# Patient Record
Sex: FEMALE | Race: WHITE | NOT HISPANIC OR LATINO
[De-identification: names, ages, dates, MRNs, and addresses within clinical notes are randomized per-mention and may not be internally consistent; named-entity substitution may affect disease eponyms.]

---

## 2017-04-19 ENCOUNTER — RESULT REVIEW (OUTPATIENT)
Age: 67
End: 2017-04-19

## 2017-04-20 ENCOUNTER — APPOINTMENT (OUTPATIENT)
Dept: SURGERY | Facility: CLINIC | Age: 67
End: 2017-04-20

## 2017-04-20 ENCOUNTER — LABORATORY RESULT (OUTPATIENT)
Age: 67
End: 2017-04-20

## 2017-04-20 VITALS
HEART RATE: 86 BPM | DIASTOLIC BLOOD PRESSURE: 83 MMHG | HEIGHT: 62 IN | SYSTOLIC BLOOD PRESSURE: 156 MMHG | BODY MASS INDEX: 31.28 KG/M2 | WEIGHT: 170 LBS

## 2017-04-20 DIAGNOSIS — Z80.0 FAMILY HISTORY OF MALIGNANT NEOPLASM OF DIGESTIVE ORGANS: ICD-10-CM

## 2017-04-27 ENCOUNTER — OTHER (OUTPATIENT)
Age: 67
End: 2017-04-27

## 2017-05-23 ENCOUNTER — APPOINTMENT (OUTPATIENT)
Dept: SURGERY | Facility: CLINIC | Age: 67
End: 2017-05-23

## 2017-05-23 RX ORDER — ALPRAZOLAM 0.5 MG/1
0.5 TABLET ORAL
Qty: 30 | Refills: 0 | Status: ACTIVE | COMMUNITY
Start: 2017-03-06

## 2018-07-23 PROBLEM — Z80.0 FAMILY HISTORY OF COLON CANCER: Status: ACTIVE | Noted: 2017-04-20

## 2018-12-27 ENCOUNTER — APPOINTMENT (OUTPATIENT)
Dept: SURGERY | Facility: CLINIC | Age: 68
End: 2018-12-27
Payer: MEDICARE

## 2018-12-27 DIAGNOSIS — R22.1 LOCALIZED SWELLING, MASS AND LUMP, NECK: ICD-10-CM

## 2018-12-27 PROCEDURE — 99214 OFFICE O/P EST MOD 30 MIN: CPT

## 2019-01-07 ENCOUNTER — OTHER (OUTPATIENT)
Age: 69
End: 2019-01-07

## 2019-05-23 ENCOUNTER — APPOINTMENT (OUTPATIENT)
Dept: SURGERY | Facility: CLINIC | Age: 69
End: 2019-05-23
Payer: MEDICARE

## 2019-05-23 PROCEDURE — 99213 OFFICE O/P EST LOW 20 MIN: CPT

## 2019-05-23 NOTE — HISTORY OF PRESENT ILLNESS
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 denies pain, drainage, infection, new lesions. no changes in headaches.  no changes in meningioma.

## 2019-05-23 NOTE — PHYSICAL EXAM
[de-identified] : no palpable thyroid nodules [Laryngoscopy Performed] : laryngoscopy was performed, see procedure section for findings [Midline] : located in midline position [Normal] : orientation to person, place, and time: normal [de-identified] : 1.5 cm right parotid mass, well circumscribed and mobile

## 2019-07-09 ENCOUNTER — OUTPATIENT (OUTPATIENT)
Dept: OUTPATIENT SERVICES | Facility: HOSPITAL | Age: 69
LOS: 1 days | End: 2019-07-09
Payer: MEDICARE

## 2019-07-09 VITALS
RESPIRATION RATE: 16 BRPM | WEIGHT: 173.94 LBS | SYSTOLIC BLOOD PRESSURE: 120 MMHG | HEIGHT: 60.75 IN | OXYGEN SATURATION: 97 % | TEMPERATURE: 99 F | HEART RATE: 71 BPM | DIASTOLIC BLOOD PRESSURE: 74 MMHG

## 2019-07-09 DIAGNOSIS — Z98.89 OTHER SPECIFIED POSTPROCEDURAL STATES: Chronic | ICD-10-CM

## 2019-07-09 DIAGNOSIS — K11.9 DISEASE OF SALIVARY GLAND, UNSPECIFIED: ICD-10-CM

## 2019-07-09 DIAGNOSIS — Z98.890 OTHER SPECIFIED POSTPROCEDURAL STATES: Chronic | ICD-10-CM

## 2019-07-09 LAB
ANION GAP SERPL CALC-SCNC: 13 MMO/L — SIGNIFICANT CHANGE UP (ref 7–14)
BUN SERPL-MCNC: 17 MG/DL — SIGNIFICANT CHANGE UP (ref 7–23)
CALCIUM SERPL-MCNC: 9.4 MG/DL — SIGNIFICANT CHANGE UP (ref 8.4–10.5)
CHLORIDE SERPL-SCNC: 99 MMOL/L — SIGNIFICANT CHANGE UP (ref 98–107)
CO2 SERPL-SCNC: 27 MMOL/L — SIGNIFICANT CHANGE UP (ref 22–31)
CREAT SERPL-MCNC: 0.63 MG/DL — SIGNIFICANT CHANGE UP (ref 0.5–1.3)
GLUCOSE SERPL-MCNC: 64 MG/DL — LOW (ref 70–99)
HCT VFR BLD CALC: 45.5 % — HIGH (ref 34.5–45)
HGB BLD-MCNC: 14.3 G/DL — SIGNIFICANT CHANGE UP (ref 11.5–15.5)
MCHC RBC-ENTMCNC: 28.6 PG — SIGNIFICANT CHANGE UP (ref 27–34)
MCHC RBC-ENTMCNC: 31.4 % — LOW (ref 32–36)
MCV RBC AUTO: 91 FL — SIGNIFICANT CHANGE UP (ref 80–100)
NRBC # FLD: 0 K/UL — SIGNIFICANT CHANGE UP (ref 0–0)
PLATELET # BLD AUTO: 308 K/UL — SIGNIFICANT CHANGE UP (ref 150–400)
PMV BLD: 9.5 FL — SIGNIFICANT CHANGE UP (ref 7–13)
POTASSIUM SERPL-MCNC: 3.4 MMOL/L — LOW (ref 3.5–5.3)
POTASSIUM SERPL-SCNC: 3.4 MMOL/L — LOW (ref 3.5–5.3)
RBC # BLD: 5 M/UL — SIGNIFICANT CHANGE UP (ref 3.8–5.2)
RBC # FLD: 14 % — SIGNIFICANT CHANGE UP (ref 10.3–14.5)
SODIUM SERPL-SCNC: 139 MMOL/L — SIGNIFICANT CHANGE UP (ref 135–145)
WBC # BLD: 14.57 K/UL — HIGH (ref 3.8–10.5)
WBC # FLD AUTO: 14.57 K/UL — HIGH (ref 3.8–10.5)

## 2019-07-09 PROCEDURE — 93010 ELECTROCARDIOGRAM REPORT: CPT

## 2019-07-09 NOTE — H&P PST ADULT - NSICDXPROBLEM_GEN_ALL_CORE_FT
PROBLEM DIAGNOSES  Problem: Disease of salivary gland, unspecified  Assessment and Plan: Scheduled for right parotidectomy, facial nerve dissection on 07/15/19. Preop instructions, famotidine, chlorhexidene gluconate soap given and explained. Written instructions given. Pt verbalized understanding with teach back demonstration.  Pending medical consultation as per surgeon's request

## 2019-07-09 NOTE — H&P PST ADULT - NEGATIVE CARDIOVASCULAR SYMPTOMS
no claudication/no palpitations/no peripheral edema/no orthopnea/no paroxysmal nocturnal dyspnea/no chest pain/no dyspnea on exertion

## 2019-07-09 NOTE — H&P PST ADULT - NEGATIVE GASTROINTESTINAL SYMPTOMS
no nausea/no diarrhea/no jaundice/no constipation/no abdominal pain/no melena/no vomiting/no change in bowel habits/no hiccoughs

## 2019-07-09 NOTE — H&P PST ADULT - NEGATIVE OPHTHALMOLOGIC SYMPTOMS
no irritation L/no loss of vision L/no lacrimation L/no lacrimation R/no blurred vision L/no pain R/no loss of vision R/no diplopia/no discharge L/no pain L/no irritation R/no photophobia/no blurred vision R/no discharge R

## 2019-07-09 NOTE — H&P PST ADULT - NSANTHOSAYNRD_GEN_A_CORE
No. YI screening performed.  STOP BANG Legend: 0-2 = LOW Risk; 3-4 = INTERMEDIATE Risk; 5-8 = HIGH Risk

## 2019-07-09 NOTE — H&P PST ADULT - HISTORY OF PRESENT ILLNESS
70 y/o  female with PMH: Hypothyroidism, Obesity   S/P MRI right parotid tumor x 3 years. S/P biopsy 1 1/2 year ago - benign. Now scheduled for right parotidectomy on 07/15/19 70 y/o  female with PMH: Hypothyroidism, Obesity presents to PST for pre op evaluation with h/o right parotid tumor x 3 years found incidentally via MRI. S/P biopsy 1 1/2 year ago - benign. Now scheduled for right parotidectomy on 07/15/19

## 2019-07-09 NOTE — H&P PST ADULT - NSICDXPASTSURGICALHX_GEN_ALL_CORE_FT
PAST SURGICAL HISTORY:  H/O excision of mass right hand-4/30/12    H/O left breast biopsy 1976    H/O ovarian cystectomy 1970's    History of appendectomy 1956    History of tonsillectomy 1960    S/P arthroscopy of shoulder left , rotator cuff repair; 01/2014

## 2019-07-09 NOTE — H&P PST ADULT - SYMPTOMS
Chief Complaint   Patient presents with    Establish Care    Well Woman       History of Present Illness: Candelaria Parra is a 52 y.o. female that presents today 2017 for well gyn visit.    Past Medical History:   Diagnosis Date    Abnormal Pap smear of cervix        Past Surgical History:   Procedure Laterality Date    CRYOTHERAPY         No current outpatient prescriptions on file.     No current facility-administered medications for this visit.        Review of patient's allergies indicates:  No Known Allergies    Family History   Problem Relation Age of Onset    Breast cancer Maternal Aunt     Ovarian cancer Neg Hx        Social History     Social History    Marital status:      Spouse name: N/A    Number of children: N/A    Years of education: N/A     Social History Main Topics    Smoking status: Former Smoker    Smokeless tobacco: None    Alcohol use No    Drug use: No    Sexual activity: Not Currently     Partners: Male     Other Topics Concern    None     Social History Narrative    None       OB History    Para Term  AB Living   10 4     5 4   SAB TAB Ectopic Multiple Live Births   2 1 2          # Outcome Date GA Lbr Axel/2nd Weight Sex Delivery Anes PTL Lv   10          FD   9 Para      Vag-Spont      8 Para      Vag-Spont      7 Para      Vag-Spont      6 Para      Vag-Spont      5 TAB            4 SAB            3 SAB            2 Ectopic            1 Ectopic                   Review of Symptoms:  GENERAL: Denies weight gain or weight loss. Feeling well overall.   SKIN: Denies rash or lesions.   HEAD: Denies head injury or headache.   NODES: Denies enlarged lymph nodes.   CHEST: Denies chest pain or shortness of breath.   CARDIOVASCULAR: Denies palpitations or left sided chest pain.   ABDOMEN: No abdominal pain, constipation, diarrhea, nausea, vomiting or rectal bleeding.   URINARY: No frequency, dysuria, hematuria, or burning on urination.  HEMATOLOGIC: No  "easy bruisability or excessive bleeding.   MUSCULOSKELETAL: Denies joint pain or swelling.     /82   Ht 5' 4" (1.626 m)   Wt 77.3 kg (170 lb 6.7 oz)   Physical Exam:  APPEARANCE: Well nourished, well developed, in no acute distress.  SKIN: Normal skin turgor, no lesions.  NECK: Neck symmetric without masses   RESPIRATORY: Normal respiratory effort with no retractions or use of accessory muscles  CARDIOVASCULAR: Peripheral vascular system with no swelling no varicosities and palpation of pulses normal  LYMPHATIC: No enlargements of the lymph nodes noted in the neck, axillae, or groin  ABDOMEN: Soft. No tenderness or masses. No hepatosplenomegaly. No hernias.  BREASTS: Symmetrical, no skin changes or visible lesions. No palpable masses, nipple discharge or adenopathy bilaterally.  PELVIC: Normal external female genitalia without lesions. Normal hair distribution. Adequate perineal body, normal urethral meatus. Urethra with no masses.  Bladder nontender. Vagina moist and well rugated without lesions or discharge. Cervix pink and without lesions. No significant cystocele or rectocele. Bimanual exam showed uterus normal size, shape, position, mobile and nontender. Adnexa without masses or tenderness. Urethra and bladder normal. PAP DONE  EXTREMITIES: No clubbing cyanosis or edema.    ASSESSMENT/PLAN:  Encounter for Pap smear of cervix with HPV DNA cotesting  -     Liquid-based pap smear, screening  -     HPV High Risk Genotypes, PCR    Visit for screening mammogram  -     Mammo Digital Screening Bilat With CAD; Future; Expected date: 06/22/2017          Patient was counseled today on Pap guidelines, recommendation for pelvic exams, mammograms every other year after the age of 40 and annually after the age of 50, Colonoscopy after the age of 50, Dexa Bone Scan and calcium and vitamin D supplementation in menopause and to see her PCP for other health maintenance.   FOLLOW-UP:prn  " none

## 2019-07-09 NOTE — H&P PST ADULT - NEGATIVE ENMT SYMPTOMS
no nasal obstruction/no ear pain/no post-nasal discharge/no tinnitus/no abnormal taste sensation/no recurrent cold sores/no dysphagia/no nasal congestion/no nose bleeds/no vertigo/no hearing difficulty/no nasal discharge

## 2019-07-09 NOTE — H&P PST ADULT - RS GEN PE MLT RESP DETAILS PC
no wheezes/respirations non-labored/breath sounds equal/good air movement/no chest wall tenderness/no intercostal retractions/clear to auscultation bilaterally/no subcutaneous emphysema/no rales/airway patent/no rhonchi

## 2019-07-09 NOTE — H&P PST ADULT - NEGATIVE PSYCHIATRIC SYMPTOMS
no suicidal ideation/no depression/no memory loss/no agitation/no hyperactivity/no insomnia/no mood swings/no paranoia

## 2019-07-12 PROBLEM — E03.9 HYPOTHYROIDISM, UNSPECIFIED: Chronic | Status: ACTIVE | Noted: 2019-07-09

## 2019-07-12 RX ORDER — FAMOTIDINE 10 MG/ML
1 INJECTION INTRAVENOUS
Qty: 0 | Refills: 0 | DISCHARGE

## 2019-07-15 ENCOUNTER — OTHER (OUTPATIENT)
Age: 69
End: 2019-07-15

## 2019-08-29 ENCOUNTER — OUTPATIENT (OUTPATIENT)
Dept: OUTPATIENT SERVICES | Facility: HOSPITAL | Age: 69
LOS: 1 days | End: 2019-08-29

## 2019-08-29 VITALS
RESPIRATION RATE: 15 BRPM | TEMPERATURE: 98 F | HEART RATE: 81 BPM | HEIGHT: 61.5 IN | OXYGEN SATURATION: 97 % | SYSTOLIC BLOOD PRESSURE: 124 MMHG | DIASTOLIC BLOOD PRESSURE: 80 MMHG | WEIGHT: 171.96 LBS

## 2019-08-29 DIAGNOSIS — K11.9 DISEASE OF SALIVARY GLAND, UNSPECIFIED: ICD-10-CM

## 2019-08-29 DIAGNOSIS — Z98.89 OTHER SPECIFIED POSTPROCEDURAL STATES: Chronic | ICD-10-CM

## 2019-08-29 DIAGNOSIS — D49.0 NEOPLASM OF UNSPECIFIED BEHAVIOR OF DIGESTIVE SYSTEM: ICD-10-CM

## 2019-08-29 DIAGNOSIS — Z98.890 OTHER SPECIFIED POSTPROCEDURAL STATES: Chronic | ICD-10-CM

## 2019-08-29 LAB
ANION GAP SERPL CALC-SCNC: 13 MMO/L — SIGNIFICANT CHANGE UP (ref 7–14)
BUN SERPL-MCNC: 12 MG/DL — SIGNIFICANT CHANGE UP (ref 7–23)
CALCIUM SERPL-MCNC: 9.4 MG/DL — SIGNIFICANT CHANGE UP (ref 8.4–10.5)
CHLORIDE SERPL-SCNC: 103 MMOL/L — SIGNIFICANT CHANGE UP (ref 98–107)
CO2 SERPL-SCNC: 25 MMOL/L — SIGNIFICANT CHANGE UP (ref 22–31)
CREAT SERPL-MCNC: 0.5 MG/DL — SIGNIFICANT CHANGE UP (ref 0.5–1.3)
GLUCOSE SERPL-MCNC: 120 MG/DL — HIGH (ref 70–99)
HCT VFR BLD CALC: 42.3 % — SIGNIFICANT CHANGE UP (ref 34.5–45)
HGB BLD-MCNC: 13.6 G/DL — SIGNIFICANT CHANGE UP (ref 11.5–15.5)
MCHC RBC-ENTMCNC: 28 PG — SIGNIFICANT CHANGE UP (ref 27–34)
MCHC RBC-ENTMCNC: 32.2 % — SIGNIFICANT CHANGE UP (ref 32–36)
MCV RBC AUTO: 87.2 FL — SIGNIFICANT CHANGE UP (ref 80–100)
NRBC # FLD: 0.02 K/UL — SIGNIFICANT CHANGE UP (ref 0–0)
PLATELET # BLD AUTO: 231 K/UL — SIGNIFICANT CHANGE UP (ref 150–400)
PMV BLD: 9.8 FL — SIGNIFICANT CHANGE UP (ref 7–13)
POTASSIUM SERPL-MCNC: 3.5 MMOL/L — SIGNIFICANT CHANGE UP (ref 3.5–5.3)
POTASSIUM SERPL-SCNC: 3.5 MMOL/L — SIGNIFICANT CHANGE UP (ref 3.5–5.3)
RBC # BLD: 4.85 M/UL — SIGNIFICANT CHANGE UP (ref 3.8–5.2)
RBC # FLD: 13.3 % — SIGNIFICANT CHANGE UP (ref 10.3–14.5)
SODIUM SERPL-SCNC: 141 MMOL/L — SIGNIFICANT CHANGE UP (ref 135–145)
WBC # BLD: 6.6 K/UL — SIGNIFICANT CHANGE UP (ref 3.8–10.5)
WBC # FLD AUTO: 6.6 K/UL — SIGNIFICANT CHANGE UP (ref 3.8–10.5)

## 2019-08-29 RX ORDER — LEVOTHYROXINE SODIUM 125 MCG
1 TABLET ORAL
Qty: 0 | Refills: 0 | DISCHARGE

## 2019-08-29 RX ORDER — LORATADINE 10 MG/1
1 TABLET ORAL
Qty: 0 | Refills: 0 | DISCHARGE

## 2019-08-29 RX ORDER — LEVOCETIRIZINE DIHYDROCHLORIDE 0.5 MG/ML
1 SOLUTION ORAL
Qty: 0 | Refills: 0 | DISCHARGE

## 2019-08-29 RX ORDER — FAMOTIDINE 10 MG/ML
20 INJECTION INTRAVENOUS
Qty: 0 | Refills: 0 | DISCHARGE

## 2019-08-29 NOTE — H&P PST ADULT - NEGATIVE GASTROINTESTINAL SYMPTOMS
no diarrhea/no hiccoughs/no nausea/no abdominal pain/no melena/no jaundice/no vomiting/no constipation/no change in bowel habits

## 2019-08-29 NOTE — H&P PST ADULT - NEGATIVE PSYCHIATRIC SYMPTOMS
no memory loss/no agitation/no hyperactivity/no mood swings/no depression/no insomnia/no suicidal ideation/no paranoia

## 2019-08-29 NOTE — H&P PST ADULT - HISTORY OF PRESENT ILLNESS
68 y/o female with presents to PST for pre op evaluation with h/o right parotid tumor x 3 years found incidentally via MRI. S/P biopsy 1 1/2 year ago - benign. Now scheduled for right parotidectomy on9/9/19.  S  Surgery was previously scheduled for  07/15/19, then cancelled as pt had sinus infection.  Pt reports she was treated  with antibiotic 2 weeks ago with resolution of symptoms.  Pt will return to PMD for pre-op eval next week. Surgery now rescheduled for 9/9/19. 70 y/o female with presents to PST for pre op evaluation with h/o right parotid tumor x 3 years found incidentally via MRI. S/P biopsy 1 1/2 year ago - benign. Now scheduled for right parotidectomy on 9/9/19.   Surgery was previously scheduled for  07/15/19, then cancelled as pt had sinus infection.  Pt reports she was treated  with antibiotic completed 2 weeks ago with resolution of symptoms.  Pt will return to PMD for pre-op eval next week. Surgery now rescheduled for 9/9/19.

## 2019-08-29 NOTE — H&P PST ADULT - NEGATIVE ENMT SYMPTOMS
no ear pain/no tinnitus/no recurrent cold sores/no abnormal taste sensation/no vertigo/no dysphagia/no hearing difficulty/no nasal discharge/no post-nasal discharge/no nose bleeds/no nasal congestion/no nasal obstruction

## 2019-08-29 NOTE — H&P PST ADULT - ASSESSMENT
68 y/o female with presents to PST for pre op evaluation with h/o right parotid tumor x 3 years found incidentally via MRI. S/P biopsy 1 1/2 year ago - benign. Now scheduled for right parotidectomy on 9/9/19.   Surgery was previously scheduled for  07/15/19, then cancelled as pt had sinus infection.  Pt reports she was treated  with antibiotic completed 2 weeks ago with resolution of symptoms.  Pt will return to PMD for pre-op eval next week. Surgery now rescheduled for 9/9/19.

## 2019-08-29 NOTE — H&P PST ADULT - NSICDXPASTMEDICALHX_GEN_ALL_CORE_FT
PAST MEDICAL HISTORY:  Hypothyroidism     Obese     Seasonal allergies PAST MEDICAL HISTORY:  Chronic GERD     H/O seasonal allergies     Hypothyroidism     Obese     Seasonal allergies PAST MEDICAL HISTORY:  GERD (gastroesophageal reflux disease) intermittent, no meds    H/O seasonal allergies     Hypothyroidism     Obese     Seasonal allergies

## 2019-08-29 NOTE — H&P PST ADULT - NEGATIVE OPHTHALMOLOGIC SYMPTOMS
no lacrimation L/no lacrimation R/no blurred vision L/no blurred vision R/no discharge R/no pain L/no irritation R/no loss of vision L/no loss of vision R/no discharge L/no pain R/no irritation L/no diplopia/no photophobia

## 2019-08-29 NOTE — H&P PST ADULT - ENMT COMMENTS
recent sinus infection treated with course of antibiotics completed 2 weeks ago per pt.  pt has appt  to see PMD next for re-eval and pre-op for surgery right parotid mass palpated

## 2019-08-29 NOTE — H&P PST ADULT - NSICDXPROBLEM_GEN_ALL_CORE_FT
PROBLEM DIAGNOSES  Problem: Parotid tumor  Assessment and Plan: right parotidectomy on 9/9/19.   Surgery was previously scheduled for  07/15/19, then cancelled as pt had sinus infection.  Pt reports she was treated  with antibiotic completed 2 weeks ago with resolution of symptoms.    Pt to see PMD next week for pre-op eval, requested on chart  CBC BMP EKG   Preop instructions and antibacterial soap given and explained (verbal and written), with teach back.

## 2019-08-29 NOTE — H&P PST ADULT - NEGATIVE CARDIOVASCULAR SYMPTOMS
no peripheral edema/no paroxysmal nocturnal dyspnea/no chest pain/no dyspnea on exertion/no orthopnea/no claudication/no palpitations

## 2019-09-08 ENCOUNTER — TRANSCRIPTION ENCOUNTER (OUTPATIENT)
Age: 69
End: 2019-09-08

## 2019-09-09 ENCOUNTER — RESULT REVIEW (OUTPATIENT)
Age: 69
End: 2019-09-09

## 2019-09-09 ENCOUNTER — OUTPATIENT (OUTPATIENT)
Dept: OUTPATIENT SERVICES | Facility: HOSPITAL | Age: 69
LOS: 1 days | Discharge: HOME | End: 2019-09-09
Payer: MEDICARE

## 2019-09-09 ENCOUNTER — OTHER (OUTPATIENT)
Age: 69
End: 2019-09-09

## 2019-09-09 ENCOUNTER — APPOINTMENT (OUTPATIENT)
Dept: SURGERY | Facility: HOSPITAL | Age: 69
End: 2019-09-09

## 2019-09-09 VITALS
OXYGEN SATURATION: 96 % | TEMPERATURE: 98 F | WEIGHT: 171.96 LBS | RESPIRATION RATE: 18 BRPM | DIASTOLIC BLOOD PRESSURE: 98 MMHG | HEIGHT: 61.5 IN | HEART RATE: 81 BPM | SYSTOLIC BLOOD PRESSURE: 164 MMHG

## 2019-09-09 VITALS
DIASTOLIC BLOOD PRESSURE: 75 MMHG | RESPIRATION RATE: 15 BRPM | HEART RATE: 87 BPM | SYSTOLIC BLOOD PRESSURE: 138 MMHG | OXYGEN SATURATION: 100 %

## 2019-09-09 DIAGNOSIS — Z98.89 OTHER SPECIFIED POSTPROCEDURAL STATES: Chronic | ICD-10-CM

## 2019-09-09 DIAGNOSIS — Z98.890 OTHER SPECIFIED POSTPROCEDURAL STATES: Chronic | ICD-10-CM

## 2019-09-09 DIAGNOSIS — K11.9 DISEASE OF SALIVARY GLAND, UNSPECIFIED: ICD-10-CM

## 2019-09-09 PROCEDURE — 42415 EXCISE PAROTID GLAND/LESION: CPT

## 2019-09-09 PROCEDURE — 88307 TISSUE EXAM BY PATHOLOGIST: CPT | Mod: 26

## 2019-09-09 PROCEDURE — 13132 CMPLX RPR F/C/C/M/N/AX/G/H/F: CPT | Mod: 59

## 2019-09-09 PROCEDURE — 42415 EXCISE PAROTID GLAND/LESION: CPT | Mod: AS

## 2019-09-09 PROCEDURE — 64716 REVISION OF CRANIAL NERVE: CPT | Mod: AS,59

## 2019-09-09 PROCEDURE — 64716 REVISION OF CRANIAL NERVE: CPT | Mod: 59

## 2019-09-09 RX ORDER — BENZOCAINE AND MENTHOL 5; 1 G/100ML; G/100ML
1 LIQUID ORAL
Refills: 0 | Status: DISCONTINUED | OUTPATIENT
Start: 2019-09-09 | End: 2019-09-09

## 2019-09-09 RX ORDER — ONDANSETRON 8 MG/1
4 TABLET, FILM COATED ORAL ONCE
Refills: 0 | Status: DISCONTINUED | OUTPATIENT
Start: 2019-09-09 | End: 2019-09-09

## 2019-09-09 RX ORDER — FEXOFENADINE HCL 30 MG
1 TABLET ORAL
Qty: 0 | Refills: 0 | DISCHARGE

## 2019-09-09 RX ORDER — FENTANYL CITRATE 50 UG/ML
50 INJECTION INTRAVENOUS
Refills: 0 | Status: DISCONTINUED | OUTPATIENT
Start: 2019-09-09 | End: 2019-09-09

## 2019-09-09 RX ORDER — OXYCODONE AND ACETAMINOPHEN 5; 325 MG/1; MG/1
1 TABLET ORAL EVERY 4 HOURS
Refills: 0 | Status: DISCONTINUED | OUTPATIENT
Start: 2019-09-09 | End: 2019-09-09

## 2019-09-09 RX ORDER — ACETAMINOPHEN 500 MG
2 TABLET ORAL
Qty: 0 | Refills: 0 | DISCHARGE
Start: 2019-09-09

## 2019-09-09 RX ORDER — OXYCODONE HYDROCHLORIDE 5 MG/1
10 TABLET ORAL ONCE
Refills: 0 | Status: DISCONTINUED | OUTPATIENT
Start: 2019-09-09 | End: 2019-09-09

## 2019-09-09 RX ORDER — LEVOTHYROXINE SODIUM 125 MCG
1 TABLET ORAL
Qty: 0 | Refills: 0 | DISCHARGE

## 2019-09-09 RX ORDER — OXYCODONE HYDROCHLORIDE 5 MG/1
5 TABLET ORAL ONCE
Refills: 0 | Status: DISCONTINUED | OUTPATIENT
Start: 2019-09-09 | End: 2019-09-09

## 2019-09-09 RX ORDER — FENTANYL CITRATE 50 UG/ML
25 INJECTION INTRAVENOUS
Refills: 0 | Status: DISCONTINUED | OUTPATIENT
Start: 2019-09-09 | End: 2019-09-09

## 2019-09-09 RX ORDER — SODIUM CHLORIDE 9 MG/ML
1000 INJECTION, SOLUTION INTRAVENOUS
Refills: 0 | Status: DISCONTINUED | OUTPATIENT
Start: 2019-09-09 | End: 2019-09-09

## 2019-09-09 RX ORDER — ACETAMINOPHEN 500 MG
650 TABLET ORAL EVERY 6 HOURS
Refills: 0 | Status: DISCONTINUED | OUTPATIENT
Start: 2019-09-09 | End: 2019-09-09

## 2019-09-09 RX ADMIN — SODIUM CHLORIDE 60 MILLILITER(S): 9 INJECTION, SOLUTION INTRAVENOUS at 10:54

## 2019-09-09 RX ADMIN — BENZOCAINE AND MENTHOL 1 LOZENGE: 5; 1 LIQUID ORAL at 15:32

## 2019-09-09 RX ADMIN — OXYCODONE HYDROCHLORIDE 5 MILLIGRAM(S): 5 TABLET ORAL at 15:09

## 2019-09-09 RX ADMIN — OXYCODONE HYDROCHLORIDE 5 MILLIGRAM(S): 5 TABLET ORAL at 15:41

## 2019-09-09 NOTE — ASU DISCHARGE PLAN (ADULT/PEDIATRIC) - NURSING INSTRUCTIONS
You received IV Tylenol for pain management at ___. Please DO NOT take any Tylenol (Acetaminophen) containing products, such as Vicodin, Percocet, Excedrin, and cold medications for the next 6 hours (until ___ PM). DO NOT TAKE MORE THAN 3000 MG OF TYLENOL in a 24 hour period.

## 2019-09-09 NOTE — ASU PATIENT PROFILE, ADULT - PMH
GERD (gastroesophageal reflux disease)  intermittent, no meds  H/O seasonal allergies    Hypothyroidism    Obese    Seasonal allergies

## 2019-09-09 NOTE — ASU PATIENT PROFILE, ADULT - PSH
H/O excision of mass  right hand-4/30/12  H/O left breast biopsy  1976  H/O ovarian cystectomy  1970's  History of appendectomy  1956  History of tonsillectomy  1960  S/P arthroscopy of shoulder  left , rotator cuff repair; 01/2014

## 2019-09-10 ENCOUNTER — APPOINTMENT (OUTPATIENT)
Dept: SURGERY | Facility: CLINIC | Age: 69
End: 2019-09-10
Payer: MEDICARE

## 2019-09-10 PROBLEM — K21.9 GASTRO-ESOPHAGEAL REFLUX DISEASE WITHOUT ESOPHAGITIS: Chronic | Status: ACTIVE | Noted: 2019-08-29

## 2019-09-10 PROBLEM — Z88.9 ALLERGY STATUS TO UNSPECIFIED DRUGS, MEDICAMENTS AND BIOLOGICAL SUBSTANCES: Chronic | Status: ACTIVE | Noted: 2019-08-29

## 2019-09-10 PROCEDURE — 99024 POSTOP FOLLOW-UP VISIT: CPT

## 2019-09-10 NOTE — PHYSICAL EXAM
[Midline] : located in midline position [Normal] : orientation to person, place, and time: normal [de-identified] : Minimal postop swelling

## 2019-09-10 NOTE — ASSESSMENT
[FreeTextEntry1] : s/p parotidectomy\par daily care\par drain removed\par call for path\par f/u 2 months

## 2019-09-12 LAB — SURGICAL PATHOLOGY STUDY: SIGNIFICANT CHANGE UP

## 2019-09-17 ENCOUNTER — OTHER (OUTPATIENT)
Age: 69
End: 2019-09-17

## 2019-11-12 ENCOUNTER — APPOINTMENT (OUTPATIENT)
Dept: SURGERY | Facility: CLINIC | Age: 69
End: 2019-11-12
Payer: MEDICARE

## 2019-11-12 PROCEDURE — 99024 POSTOP FOLLOW-UP VISIT: CPT

## 2019-11-12 NOTE — PHYSICAL EXAM
[de-identified] : well healed scar [Midline] : located in midline position [Normal] : orientation to person, place, and time: normal

## 2019-12-09 NOTE — H&P PST ADULT - MAMMOGRAM, LAST, PROFILE
----- Message from Reyes Nick MD sent at 12/9/2019  8:54 AM CST -----  Please call the patient regarding his result. Overall normal heart function and valves with mild stiffness of heart (diastolic dysfunction). Continue low salt diet and good blood pressure control.   06/2019

## 2020-03-17 ENCOUNTER — APPOINTMENT (OUTPATIENT)
Dept: SURGERY | Facility: CLINIC | Age: 70
End: 2020-03-17
Payer: MEDICARE

## 2020-03-17 PROCEDURE — 99213 OFFICE O/P EST LOW 20 MIN: CPT

## 2020-03-17 NOTE — PHYSICAL EXAM
[de-identified] : no palpable thyroid nodules [Midline] : located in midline position [Normal] : orientation to person, place, and time: normal [de-identified] : well healed operative site. no palpable masses. slightly tender area in region of greater auricular nerve

## 2020-03-17 NOTE — HISTORY OF PRESENT ILLNESS
[de-identified] : 6 months s/p right parotidectomy for PA. notes discomfort in area radiating to temple.  denies pain, drainage, infection, new lesions. no changes medically since last visit

## 2020-03-17 NOTE — ASSESSMENT
[FreeTextEntry1] : will observe. discussed that sensitivity should improve spontaneously.  no further studies needed. to return earlier if any change

## 2020-10-05 NOTE — H&P PST ADULT - GUM GEN PE MLT EXAM PC
From: Bill Braden  To: Bill Child  Sent: 10/5/2020 4:10 PM CDT  Subject: Medication Question    Update on my Covid. Temperature 101.6, one hour after Tylenol. Pulse ox 95. I don't feel real bad, just wondering if there is a set of numbers, temp & pulse ox I should think about coming in.  
Routing to triage to please advise   
not examined

## 2020-10-28 ENCOUNTER — NON-APPOINTMENT (OUTPATIENT)
Age: 70
End: 2020-10-28

## 2020-10-28 DIAGNOSIS — H53.8 OTHER VISUAL DISTURBANCES: ICD-10-CM

## 2020-10-28 DIAGNOSIS — E66.9 OBESITY, UNSPECIFIED: ICD-10-CM

## 2020-10-28 DIAGNOSIS — I10 ESSENTIAL (PRIMARY) HYPERTENSION: ICD-10-CM

## 2020-10-28 DIAGNOSIS — Z81.8 FAMILY HISTORY OF OTHER MENTAL AND BEHAVIORAL DISORDERS: ICD-10-CM

## 2020-10-28 DIAGNOSIS — R10.9 UNSPECIFIED ABDOMINAL PAIN: ICD-10-CM

## 2020-10-28 DIAGNOSIS — Z78.9 OTHER SPECIFIED HEALTH STATUS: ICD-10-CM

## 2020-10-28 DIAGNOSIS — R35.0 FREQUENCY OF MICTURITION: ICD-10-CM

## 2020-10-28 DIAGNOSIS — R14.0 ABDOMINAL DISTENSION (GASEOUS): ICD-10-CM

## 2020-10-28 DIAGNOSIS — E03.9 HYPOTHYROIDISM, UNSPECIFIED: ICD-10-CM

## 2020-10-28 DIAGNOSIS — R41.3 OTHER AMNESIA: ICD-10-CM

## 2020-11-20 ENCOUNTER — NON-APPOINTMENT (OUTPATIENT)
Age: 70
End: 2020-11-20

## 2020-12-01 ENCOUNTER — APPOINTMENT (OUTPATIENT)
Dept: NEUROLOGY | Facility: CLINIC | Age: 70
End: 2020-12-01
Payer: MEDICARE

## 2020-12-01 VITALS
HEIGHT: 62 IN | SYSTOLIC BLOOD PRESSURE: 173 MMHG | HEART RATE: 99 BPM | WEIGHT: 175 LBS | DIASTOLIC BLOOD PRESSURE: 84 MMHG | BODY MASS INDEX: 32.2 KG/M2

## 2020-12-01 VITALS — TEMPERATURE: 96 F

## 2020-12-01 PROCEDURE — 99214 OFFICE O/P EST MOD 30 MIN: CPT

## 2020-12-01 RX ORDER — AMOXICILLIN 500 MG/1
500 CAPSULE ORAL
Qty: 40 | Refills: 0 | Status: DISCONTINUED | COMMUNITY
Start: 2017-04-04 | End: 2020-12-01

## 2020-12-01 RX ORDER — RISEDRONATE SODIUM 35 MG/1
35 TABLET, FILM COATED ORAL
Qty: 12 | Refills: 0 | Status: ACTIVE | COMMUNITY
Start: 2020-09-13

## 2020-12-01 RX ORDER — PREDNISONE 20 MG/1
20 TABLET ORAL
Qty: 14 | Refills: 0 | Status: DISCONTINUED | COMMUNITY
Start: 2020-09-29

## 2020-12-01 RX ORDER — AMOXICILLIN AND CLAVULANATE POTASSIUM 875; 125 MG/1; MG/1
875-125 TABLET, COATED ORAL
Qty: 14 | Refills: 0 | Status: DISCONTINUED | COMMUNITY
Start: 2017-03-06 | End: 2020-12-01

## 2020-12-01 RX ORDER — SUMATRIPTAN 20 MG/1
20 SPRAY NASAL
Qty: 6 | Refills: 0 | Status: DISCONTINUED | COMMUNITY
Start: 2017-03-27 | End: 2020-12-01

## 2020-12-01 RX ORDER — LOSARTAN POTASSIUM AND HYDROCHLOROTHIAZIDE 12.5; 5 MG/1; MG/1
50-12.5 TABLET ORAL
Qty: 90 | Refills: 0 | Status: ACTIVE | COMMUNITY
Start: 2020-11-20

## 2020-12-01 RX ORDER — LEVOTHYROXINE SODIUM 0.17 MG/1
TABLET ORAL
Refills: 0 | Status: DISCONTINUED | COMMUNITY
End: 2020-12-01

## 2020-12-01 RX ORDER — DOXYCYCLINE HYCLATE 100 MG/1
100 TABLET ORAL
Qty: 2 | Refills: 0 | Status: DISCONTINUED | COMMUNITY
Start: 2017-05-13 | End: 2020-12-01

## 2020-12-01 RX ORDER — CHLORHEXIDINE GLUCONATE, 0.12% ORAL RINSE 1.2 MG/ML
0.12 SOLUTION DENTAL
Qty: 473 | Refills: 0 | Status: DISCONTINUED | COMMUNITY
Start: 2017-04-27 | End: 2020-12-01

## 2020-12-01 RX ORDER — CYCLOBENZAPRINE HYDROCHLORIDE 10 MG/1
10 TABLET, FILM COATED ORAL
Qty: 30 | Refills: 0 | Status: ACTIVE | COMMUNITY
Start: 2020-08-17

## 2020-12-01 NOTE — HISTORY OF PRESENT ILLNESS
[FreeTextEntry1] : Mrs. Marce hCun returned to the office having been last seen on January 7, 2020. She is a 70-year-old right-handed patient with chronic neck and shoulder pain due to cervical spondylosis. 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 She had a left frontal meningioma measuring 2.0 x 1.9 x 2.0 cm last imaged on November 2018.  She underwent a follow-up MRI of the brain November 20, 2020.  That study was unchanged compared to November 8, 2018.  There was a stable enhancing extra-axial lesion overlying the left frontal lobe measuring 1.2 x 2.0 x 2.0 cm.  There is mild mass-effect on the left frontal lobe and mild adjacent edema.\par \par She denies headaches or cognitive difficulties.  She experienced recent right eye blurring and was diagnosed with a retinal branch occlusion by Dr. Felix Vernon, a retinologist.  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  An MRI of the lumbar spine performed on September 22, 2020 revealed multilevel spondylosis.  At L3-4, there was a prominent anterior osteophyte left of midline.  There was a small to moderate sized central and left paracentral disc herniation with left intraforaminal extension and left lateral osteophyte formation causing compression of the exiting left L3 nerve root and origin of the left L4 nerve root.  There was facet arthropathy causing thecal sac flattening and mild canal stenosis.  At L4-5, there was a grade 1 spondylolisthesis, circumferential bulging of the disc annulus, ligamentum flavum prominence and facet arthropathy causing moderate canal stenosis.  There was a superimposed left intraforaminal disc herniation and annular fissure causing compression of the exiting left L4 nerve root and severe left neuroforaminal stenosis.  At L5-S1, there was posterior bulging of the disc annulus and facet arthropathy causing thecal sac flattening and mild bilateral neuroforaminal stenosis.\par \par She complains of chronic neck and less right shoulder pain.

## 2020-12-01 NOTE — PHYSICAL EXAM
[FreeTextEntry1] : Constitutional:  Patient was well-developed, well-nourished and in no acute distress. \par \par Head:  Normocephalic, atraumatic. Tympanic membranes were clear. \par \par Neck:  Supple with full range of motion.  There was mild cervical tenderness.\par \par Cardiovascular:  Cardiac rhythm was regular without murmur. There were no carotid bruits. Peripheral pulses were full and symmetric. \par \par Respiratory:  Lungs were clear. \par \par Abdomen:  Soft and nontender. \par \par Spine: There was lumbar tenderness without pain on straight leg raising.\par \par Skin:  There were no rashes. \par \par NEUROLOGICAL EXAMINATION:\par \par Mental Status: She was alert and oriented. Speech was fluent. There was no dysarthria. \par \par Cranial Nerves: \par \par II: She could finger count bilaterally. Pupils were equal and reactive. Visual fields were full. Funduscopic examination was normal. \par \par III, IV, VI:  Eye movements were full without nystagmus. \par \par V: Facial sensation was intact. \par \par VII: Facial strength was normal. \par \par VIII: Hearing was equal. \par \par IX, X: Palatal movement was normal. Phonation was normal. \par \par XI: Sternocleidomastoids and trapezii were normal. \par \par XII: Tongue was midline and movements normal. There was no lingual atrophy or fasciculations. \par \par Motor Examination: Muscle bulk, tone and strength were normal. \par \par Sensory Examination: Pinprick, vibration and joint position sense were intact. \par \par Reflexes: DTRs were 2 throughout. \par \par Plantar Responses: Plantar responses were flexor. \par \par Coordination/Cerebellar Function: There was no dysmetria on finger to nose or heel to shin testing. \par \par Gait/Stance: Gait was normal. Romberg was negative.  Tandem was mildly unsteady.\par

## 2020-12-01 NOTE — ASSESSMENT
[FreeTextEntry1] : Mrs. Chun is a 70-year-old with cervical and lumbar spondylosis and radiculopathy.  I suggested consideration of a pain management consultation.  She has an asymptomatic and stable left frontal meningioma.  There was no change over the last 2 years.  Rather than ordering a repeat MRI for next year, I instead suggested a routine follow-up visit.  A decision will be made at that visit re: timing of her next MRI.  I suggested close telephone and office follow-up.  Further management will depend upon her clinical course.

## 2020-12-01 NOTE — CONSULT LETTER
[Dear  ___] : Dear  [unfilled], [Consult Letter:] : I had the pleasure of evaluating your patient, [unfilled]. [Please see my note below.] : Please see my note below. [Consult Closing:] : Thank you very much for allowing me to participate in the care of this patient.  If you have any questions, please do not hesitate to contact me. [Sincerely,] : Sincerely, [FreeTextEntry3] : Jerry Huerta MD\par  [DrJay Jay  ___] : Dr. BROWN [DrJay Jay ___] : Dr. BROWN

## 2020-12-15 ENCOUNTER — APPOINTMENT (OUTPATIENT)
Dept: SURGERY | Facility: CLINIC | Age: 70
End: 2020-12-15
Payer: MEDICARE

## 2020-12-15 PROCEDURE — 99213 OFFICE O/P EST LOW 20 MIN: CPT

## 2020-12-15 NOTE — PHYSICAL EXAM
[de-identified] : no palpable thyroid nodules [Laryngoscopy Performed] : laryngoscopy was performed, see procedure section for findings [Midline] : located in midline position [Normal] : orientation to person, place, and time: normal [de-identified] : well healed operative site. no palpable masses. slightly tender area in region of greater auricular nerve

## 2020-12-15 NOTE — HISTORY OF PRESENT ILLNESS
[de-identified] : 1 1/2 years s/p right parotidectomy for PA. notes occasional discomfort in area radiating to temple.  denies pain, drainage, infection, new lesions. no changes medically since last visit with exception of worsening of sciatica

## 2020-12-15 NOTE — ASSESSMENT
[FreeTextEntry1] : will observe. discussed that sensitivity should improve spontaneously.  no further studies needed. to return earlier if any change.  given names of pain specialists

## 2021-04-07 NOTE — H&P PST ADULT - NEGATIVE NEUROLOGICAL SYMPTOMS
no syncope/no vertigo/no loss of sensation/no headache/no facial palsy/no transient paralysis/no weakness/no generalized seizures/no paresthesias/no tremors/no difficulty walking/no confusion/no loss of consciousness/no focal seizures
[facility]

## 2021-06-15 ENCOUNTER — APPOINTMENT (OUTPATIENT)
Dept: NEUROLOGY | Facility: CLINIC | Age: 71
End: 2021-06-15
Payer: MEDICARE

## 2021-06-15 VITALS — DIASTOLIC BLOOD PRESSURE: 81 MMHG | HEART RATE: 80 BPM | SYSTOLIC BLOOD PRESSURE: 147 MMHG

## 2021-06-15 DIAGNOSIS — R42 DIZZINESS AND GIDDINESS: ICD-10-CM

## 2021-06-15 PROCEDURE — 99213 OFFICE O/P EST LOW 20 MIN: CPT

## 2021-06-15 NOTE — ASSESSMENT
[FreeTextEntry1] : Mrs. 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  Her major symptom is low back pain with bilateral radiculopathy.  She has been hesitant to pursue pain management.  I explained that epidural steroid injections or facet injections might improve effective in moderating her pain.  I provided her with a referral.\par \par She will return to the office in 4 months for follow-up.  At that time, I will order a follow-up MRI of the brain with and without contrast to assess her left frontal meningioma.  Further management will depend upon her clinical course.

## 2021-06-15 NOTE — PHYSICAL EXAM
[FreeTextEntry1] : Constitutional:  Patient was well-developed, well-nourished and in no acute distress. \par \par Head:  Normocephalic, atraumatic. Tympanic membranes were clear. \par \par Neck:  Supple with full range of motion.  There was mild cervical tenderness.\par \par Cardiovascular:  Cardiac rhythm was regular without murmur. There were no carotid bruits. Peripheral pulses were full and symmetric. \par \par Respiratory:  Lungs were clear. \par \par Abdomen:  Soft and nontender. \par \par Spine: There was lumbar tenderness without pain on straight leg raising.  Bobby's maneuver produced mild right buttock tenderness on internal rotation.  The left side was negative.\par \par Skin:  There were no rashes. \par \par NEUROLOGICAL EXAMINATION:\par \par Mental Status: She was alert and oriented. Speech was fluent. There was no dysarthria. \par \par Cranial Nerves: \par \par II: She could finger count bilaterally. Pupils were equal and reactive. Visual fields were full. Funduscopic examination was normal. \par \par III, IV, VI:  Eye movements were full without nystagmus. \par \par V: Facial sensation was intact. \par \par VII: Facial strength was normal. \par \par VIII: Hearing was equal. \par \par IX, X: Palatal movement was normal. Phonation was normal. \par \par XI: Sternocleidomastoids and trapezii were normal. \par \par XII: Tongue was midline and movements normal. There was no lingual atrophy or fasciculations. \par \par Motor Examination: Muscle bulk, tone and strength were normal. \par \par Sensory Examination: Pinprick, vibration and joint position sense were intact. \par \par Reflexes: DTRs were 2 throughout. \par \par Plantar Responses: Plantar responses were flexor. \par \par Coordination/Cerebellar Function: There was no dysmetria on finger to nose or heel to shin testing. \par \par Gait/Stance: Gait was normal. Romberg was negative.  Tandem was mildly unsteady.\par

## 2021-06-15 NOTE — HISTORY OF PRESENT ILLNESS
[FreeTextEntry1] : Mrs. Marce Chun returned to the office having been last seen on December 1, 2020. She is a 71-year-old right-handed patient with chronic neck and shoulder pain due to cervical spondylosis.  She also has an asymptomatic left frontal meningioma.  She was last imaged in November 2020 and there was no change compared to 2018.  There was a left frontal 1.2 x 2 x 2 cm extra-axial mass with mild mass-effect on the left frontal lobe and mild adjacent edema.\par \par She complains of persistent low back pain radiating to both buttocks and right posterior thigh.  This is exacerbated by standing.  She denies weakness, numbness or sphincteric difficulties.  She complains of neck and bilateral shoulder pain.  Since last seen, she was treated by Dr. Rodriguez, her internist with prednisone and gabapentin with dubious benefit.  She was seen by physical therapy and chiropractor without improvement.\par \par An MRI of the lumbar spine performed on September 22, 2020 revealed multilevel spondylosis.  At L3-4, there was a prominent anterior osteophyte left of midline.  There was a small to moderate sized central and left paracentral disc herniation with left intraforaminal extension and left lateral osteophyte formation causing compression of the exiting left L3 nerve root and origin of the left L4 nerve root.  There was facet arthropathy causing thecal sac flattening and mild canal stenosis.  At L4-5, there was a grade 1 spondylolisthesis, circumferential bulging of the disc annulus, ligamentum flavum prominence and facet arthropathy causing moderate canal stenosis.  There was a superimposed left intraforaminal disc herniation and annular fissure causing compression of the exiting left L4 nerve root and severe left neuroforaminal stenosis.  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

## 2021-10-28 ENCOUNTER — APPOINTMENT (OUTPATIENT)
Dept: NEUROLOGY | Facility: CLINIC | Age: 71
End: 2021-10-28
Payer: MEDICARE

## 2021-10-28 ENCOUNTER — APPOINTMENT (OUTPATIENT)
Dept: SURGERY | Facility: CLINIC | Age: 71
End: 2021-10-28
Payer: MEDICARE

## 2021-10-28 VITALS
WEIGHT: 178 LBS | BODY MASS INDEX: 32.76 KG/M2 | HEART RATE: 80 BPM | HEIGHT: 62 IN | DIASTOLIC BLOOD PRESSURE: 84 MMHG | SYSTOLIC BLOOD PRESSURE: 153 MMHG

## 2021-10-28 DIAGNOSIS — M54.50 LOW BACK PAIN, UNSPECIFIED: ICD-10-CM

## 2021-10-28 PROCEDURE — 99214 OFFICE O/P EST MOD 30 MIN: CPT

## 2021-10-28 PROCEDURE — 99213 OFFICE O/P EST LOW 20 MIN: CPT

## 2021-10-28 NOTE — HISTORY OF PRESENT ILLNESS
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 notes occasional discomfort in area radiating to temple.  denies pain, drainage, infection, new lesions. no changes medically since last visit with exception of worsening of sciatica.  occasional right temple pain, not responding to recent root canal.  dr Huerta w/u multiple neurologic issues.   I have reviewed all old and new data and available images.  Additional information was obtained from others present at the time of visit to ensure the completeness of the history\par '

## 2021-10-28 NOTE — ASSESSMENT
[FreeTextEntry1] : will observe. discussed that sensitivity likely related to neuroma in continuity.  no further studies needed. to return earlier if any change.   patient has been given the opportunity to ask questions, and all of the patient's questions have been answered to their satisfaction\par

## 2021-10-28 NOTE — HISTORY OF PRESENT ILLNESS
[FreeTextEntry1] : Mrs. Marce Chun returned to the office having been last seen on Gisela 15, 2021. She is a 71-year-old right-handed patient with chronic neck and shoulder pain due to cervical spondylosis.  She also has an asymptomatic left frontal meningioma.  She was last imaged in November 2020 and there was no change compared to 2018.  There was a left frontal 1.2 x 2 x 2 cm extra-axial mass with mild mass-effect on the left frontal lobe and mild adjacent edema.\par \par When last seen, she was complaining of low back pain with bilateral radicular radiation and neck and shoulder pain.  \par \par An MRI of the lumbar spine performed on September 22, 2020 revealed multilevel spondylosis.  At L3-4, there was a prominent anterior osteophyte left of midline.  There was a small to moderate sized central and left paracentral disc herniation with left intraforaminal extension and left lateral osteophyte formation causing compression of the exiting left L3 nerve root and origin of the left L4 nerve root.  There was facet arthropathy causing thecal sac flattening and mild canal stenosis.  At L4-5, there was a grade 1 spondylolisthesis, circumferential bulging of the disc annulus, ligamentum flavum prominence and facet arthropathy causing moderate canal stenosis.  There was a superimposed left intraforaminal disc herniation and annular fissure causing compression of the exiting left L4 nerve root and severe left neuroforaminal stenosis.  At L5-S1, there was posterior bulging of the disc annulus and facet arthropathy causing thecal sac flattening and mild bilateral neuroforaminal stenosis.\par \par She is still receiving injections into her right eye for retinal venous branch occlusion.\par \par She reports persistent neck pain and stiffness.  She has had right hemicranial discomfort since excision of a parotid lesion by Dr. Hdz.  She complains of low back pain particularly on the right side radiating to her right posterior thigh and calf.  She was evaluated at [facility] pain center but declined any pain management.

## 2021-10-28 NOTE — PHYSICAL EXAM
[de-identified] : no palpable thyroid nodules [Laryngoscopy Performed] : laryngoscopy was performed, see procedure section for findings [Midline] : located in midline position [Normal] : orientation to person, place, and time: normal [de-identified] : well healed operative site. no palpable masses. slightly tender area in region of greater auricular nerve

## 2021-10-28 NOTE — ASSESSMENT
[FreeTextEntry1] : Mrs. Chun is a 71-year-old with cervical and lumbar spondylosis and radiculopathy and an asymptomatic left frontal meningioma.  She remains symptomatic from cervical and lumbar spondylosis.  I am not in receipt of a recent cervical MRI and one will be ordered.  I also suggested checking her sedimentation rate and C-reactive protein.  I will order a follow-up MRI of the brain with and without without contrast to assess for left frontal meningioma.  I encouraged her to return to pain management for treatment of her chronic pain.  Further management will depend upon her clinical course.  She will be reevaluated in 6 months.  Telephone contact will be maintained in the meantime.

## 2021-10-28 NOTE — PHYSICAL EXAM
[FreeTextEntry1] : Constitutional:  Patient was well-developed, well-nourished and in no acute distress. \par \par Head:  Normocephalic, atraumatic. Tympanic membranes were clear.  Temporal artery pulses were full.\par \par Neck:  Supple with full range of motion.  There was mild cervical tenderness.\par \par Cardiovascular:  Cardiac rhythm was regular without murmur. There were no carotid bruits. Peripheral pulses were full and symmetric. \par \par Respiratory:  Lungs were clear. \par \par Abdomen:  Soft and nontender. \par \par Spine: There was no significant lumbar tenderness or pain on straight leg raising.  Bobby's maneuver was negative.\par \par Skin:  There were no rashes. \par \par NEUROLOGICAL EXAMINATION:\par \par Mental Status: She was alert and oriented. Speech was fluent. There was no dysarthria. \par \par Cranial Nerves: \par \par II: She could finger count bilaterally. Pupils were equal and reactive. Visual fields were full. Funduscopic examination was normal. \par \par III, IV, VI:  Eye movements were full without nystagmus. \par \par V: Facial sensation was intact. \par \par VII: Facial strength was normal. \par \par VIII: Hearing was equal. \par \par IX, X: Palatal movement was normal. Phonation was normal. \par \par XI: Sternocleidomastoids and trapezii were normal. \par \par XII: Tongue was midline and movements normal. There was no lingual atrophy or fasciculations. \par \par Motor Examination: Muscle bulk, tone and strength were normal. \par \par Sensory Examination: Pinprick, vibration and joint position sense were intact. \par \par Reflexes: DTRs were 2 throughout. \par \par Plantar Responses: Plantar responses were flexor. \par \par Coordination/Cerebellar Function: There was no dysmetria on finger to nose or heel to shin testing. \par \par Gait/Stance: Gait was normal. Romberg was negative.  Tandem was mildly unsteady.\par

## 2021-10-29 ENCOUNTER — LABORATORY RESULT (OUTPATIENT)
Age: 71
End: 2021-10-29

## 2021-10-29 ENCOUNTER — NON-APPOINTMENT (OUTPATIENT)
Age: 71
End: 2021-10-29

## 2021-10-29 LAB
BASOPHILS # BLD AUTO: 0.07 K/UL
BASOPHILS NFR BLD AUTO: 0.9 %
EOSINOPHIL # BLD AUTO: 0.17 K/UL
EOSINOPHIL NFR BLD AUTO: 2.1 %
HCT VFR BLD CALC: 45.2 %
HGB BLD-MCNC: 14.4 G/DL
IMM GRANULOCYTES NFR BLD AUTO: 0.4 %
LYMPHOCYTES # BLD AUTO: 2.82 K/UL
LYMPHOCYTES NFR BLD AUTO: 34.6 %
MAN DIFF?: NORMAL
MCHC RBC-ENTMCNC: 28.6 PG
MCHC RBC-ENTMCNC: 31.9 GM/DL
MCV RBC AUTO: 89.7 FL
MONOCYTES # BLD AUTO: 1.06 K/UL
MONOCYTES NFR BLD AUTO: 13 %
NEUTROPHILS # BLD AUTO: 3.99 K/UL
NEUTROPHILS NFR BLD AUTO: 49 %
PLATELET # BLD AUTO: 260 K/UL
RBC # BLD: 5.04 M/UL
RBC # FLD: 13 %
WBC # FLD AUTO: 8.14 K/UL

## 2021-10-30 LAB
ALBUMIN SERPL ELPH-MCNC: 4.4 G/DL
ALP BLD-CCNC: 42 U/L
ALT SERPL-CCNC: 61 U/L
ANION GAP SERPL CALC-SCNC: 13 MMOL/L
AST SERPL-CCNC: 35 U/L
BILIRUB SERPL-MCNC: 0.8 MG/DL
BUN SERPL-MCNC: 12 MG/DL
CALCIUM SERPL-MCNC: 10.5 MG/DL
CHLORIDE SERPL-SCNC: 99 MMOL/L
CO2 SERPL-SCNC: 26 MMOL/L
CREAT SERPL-MCNC: 0.65 MG/DL
CRP SERPL-MCNC: 3 MG/L
ERYTHROCYTE [SEDIMENTATION RATE] IN BLOOD BY WESTERGREN METHOD: 12 MM/HR
GLUCOSE SERPL-MCNC: 77 MG/DL
POTASSIUM SERPL-SCNC: 3.9 MMOL/L
PROT SERPL-MCNC: 7.2 G/DL
RHEUMATOID FACT SER QL: <10 IU/ML
SODIUM SERPL-SCNC: 138 MMOL/L

## 2021-10-31 LAB — B BURGDOR IGG+IGM SER QL IB: NORMAL

## 2021-11-02 LAB
CCP AB SER IA-ACNC: <8 UNITS
RF+CCP IGG SER-IMP: NEGATIVE

## 2021-11-03 LAB
ANA PAT FLD IF-IMP: ABNORMAL
ANACR T: ABNORMAL

## 2021-11-12 ENCOUNTER — NON-APPOINTMENT (OUTPATIENT)
Age: 71
End: 2021-11-12

## 2021-12-10 ENCOUNTER — NON-APPOINTMENT (OUTPATIENT)
Age: 71
End: 2021-12-10

## 2021-12-27 ENCOUNTER — APPOINTMENT (OUTPATIENT)
Dept: NEUROLOGY | Facility: CLINIC | Age: 71
End: 2021-12-27
Payer: MEDICARE

## 2021-12-27 VITALS
BODY MASS INDEX: 33.13 KG/M2 | HEIGHT: 62 IN | WEIGHT: 180 LBS | DIASTOLIC BLOOD PRESSURE: 73 MMHG | SYSTOLIC BLOOD PRESSURE: 149 MMHG | HEART RATE: 91 BPM

## 2021-12-27 PROCEDURE — 99214 OFFICE O/P EST MOD 30 MIN: CPT

## 2021-12-27 RX ORDER — GABAPENTIN 300 MG/1
300 CAPSULE ORAL 3 TIMES DAILY
Qty: 90 | Refills: 0 | Status: ACTIVE | COMMUNITY
Start: 2021-12-27 | End: 1900-01-01

## 2021-12-27 NOTE — PHYSICAL EXAM
[FreeTextEntry1] : Constitutional:  Patient was well-developed, well-nourished and in no acute distress. \par \par Head:  Normocephalic, atraumatic. Tympanic membranes were clear.  Temporal artery pulses were full.\par \par Neck:  Supple with full range of motion.  There was mild cervical tenderness.\par \par Cardiovascular:  Cardiac rhythm was regular without murmur. There were no carotid bruits. Peripheral pulses were full and symmetric. \par \par Respiratory:  Lungs were clear. \par \par Abdomen:  Soft and nontender. \par \par Spine: There was no significant lumbar tenderness or pain on straight leg raising.  Bobby's maneuver was negative.\par \par Skin: There were several raised verrucoid lesions on her left ankle, calf and right arm.  There was a hypertrophic area on her right occipital scalp.\par \par NEUROLOGICAL EXAMINATION:\par \par Mental Status: She was alert and oriented. Speech was fluent. There was no dysarthria. \par \par Cranial Nerves: \par \par II: She could finger count bilaterally. Pupils were equal and reactive. Visual fields were full. Funduscopic examination was normal. \par \par III, IV, VI:  Eye movements were full without nystagmus. \par \par V: Facial sensation was intact.  There was mild tenderness over the right mastoid where there was a scar from her parotidectomy.\par \par VII: Facial strength was normal. \par \par VIII: Hearing was equal. \par \par IX, X: Palatal movement was normal. Phonation was normal. \par \par XI: Sternocleidomastoids and trapezii were normal. \par \par XII: Tongue was midline and movements normal. There was no lingual atrophy or fasciculations. \par \par Motor Examination: Muscle bulk, tone and strength were normal. \par \par Sensory Examination: Pinprick, vibration and joint position sense were intact. \par \par Reflexes: DTRs were 2 throughout. \par \par Plantar Responses: Plantar responses were flexor. \par \par Coordination/Cerebellar Function: There was no dysmetria on finger to nose or heel to shin testing. \par \par Gait/Stance: Gait was normal. Romberg was negative.  Tandem was mildly unsteady.\par

## 2021-12-27 NOTE — HISTORY OF PRESENT ILLNESS
[FreeTextEntry1] : Mrs. Marce Chun returned to the office having been last seen on October 28, 2021. She is a 71-year-old right-handed patient with chronic neck and shoulder pain due to cervical spondylosis.  She also has an asymptomatic left frontal meningioma.  \par \par She underwent recent imaging.  MRI of the brain revealed a stable 1.9 x 2 x 2.4 cm partially calcified left frontal meningioma.  Cervical MRI reveals multilevel degenerative change with normal cord signal.\par \par She is still receiving injections into her right eye for retinal venous branch occlusion.\par \par When last seen, she complained of neck pain and stiffness.  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  \par \par I spoke to Mrs. Chun on December 10.  She describes a 1 year history of rash on her limbs.  She told me she was under the care of Dr. Aquiles Lange, a dermatologist.  She told me that Dr. Lange recommended that she see me about her skin lesions.  She was most concerned about a hypertrophic lesion on her right mid occiput.  This had been cauterized but it recurred.  It appeared that she was suffering from common warts.\par \par I spoke to Dr. Lange.  He was not in his office but told me that he would not have referred her to me for a cutaneous lesion.  He question whether there might be a neuropathic issue.  I further questioned Mrs. Chun.  She complains of right retroauricular tenderness which dates back to her 2019 parotidectomy.  She describes a sensitivity on the right side of her scalp and face.  She denies neuralgic pain.

## 2021-12-27 NOTE — ASSESSMENT
[FreeTextEntry1] : Mrs. Chun is a 71-year-old with cervical and lumbar spondylosis and radiculopathy and an asymptomatic left frontal meningioma. \par \par I was very confused about the reason for her visit today particularly with regard to the dermatologic issues.  After detailed questioning, I believe that there are 2 issues at hand.  Dr. Lange is caring for her skin lesions which may well represent common warts.  I believe that her right retroauricular pain and right scalp and posterior facial symptoms are the consequence of a possible occipital nerve injury.  This may have been due to her carotid pleomorphic adenoma.\par \par I suggested a trial of gabapentin 300 mg 3 times a day.  If ineffective, I will investigate whether she might benefit from a lesser occipital nerve block.  Further management will depend upon her clinical course.

## 2021-12-27 NOTE — REVIEW OF SYSTEMS
[Abnormal Sensation] : an abnormal sensation [Negative] : Heme/Lymph [FreeTextEntry9] : Neck and back pain

## 2022-04-12 ENCOUNTER — APPOINTMENT (OUTPATIENT)
Dept: NEUROLOGY | Facility: CLINIC | Age: 72
End: 2022-04-12
Payer: MEDICARE

## 2022-04-12 VITALS
SYSTOLIC BLOOD PRESSURE: 140 MMHG | HEART RATE: 94 BPM | BODY MASS INDEX: 32.39 KG/M2 | WEIGHT: 176 LBS | DIASTOLIC BLOOD PRESSURE: 86 MMHG | HEIGHT: 62 IN

## 2022-04-12 DIAGNOSIS — F41.9 ANXIETY DISORDER, UNSPECIFIED: ICD-10-CM

## 2022-04-12 DIAGNOSIS — D32.0 BENIGN NEOPLASM OF CEREBRAL MENINGES: ICD-10-CM

## 2022-04-12 PROCEDURE — 99213 OFFICE O/P EST LOW 20 MIN: CPT

## 2022-04-12 NOTE — ASSESSMENT
[FreeTextEntry1] : Mrs. Chun is a 71-year-old with cervical and lumbar spondylosis and radiculopathy and an asymptomatic left frontal meningioma. \par \par Her major complaint is vertex skin condition.  I am uncertain whether this represents folliculitis or possibly psoriasis.  I suggested that she follow-up with Dr. Olson about this process.  Recent imaging did not reveal any process involving her right craniocervical junction.  I suggested cautious observation.  Regarding her anxiety, I provided her with a referral for a psychiatry evaluation.  She might benefit from treatment with an antidepressant.  Further management will depend upon her clinical course.

## 2022-04-12 NOTE — PHYSICAL EXAM
[FreeTextEntry1] : Constitutional:  Patient was well-developed, well-nourished and in no acute distress. \par \par Head:  Normocephalic, atraumatic. Tympanic membranes were clear.  Temporal artery pulses were full.\par \par Neck:  Supple with full range of motion.  There was mild cervical tenderness.\par \par Cardiovascular:  Cardiac rhythm was regular without murmur. There were no carotid bruits. Peripheral pulses were full and symmetric. \par \par Respiratory:  Lungs were clear. \par \par Abdomen:  Soft and nontender. \par \par Spine: There was no significant lumbar tenderness or pain on straight leg raising.  Bobby's maneuver was negative.\par \par Skin: There was a hypertrophic area on her right occipital scalp with small scabs at the base of follicles.  There was a small hypertropic patch on her right elbow dorsum.\par \par NEUROLOGICAL EXAMINATION:\par \par Mental Status: She was alert and oriented. Speech was fluent. There was no dysarthria. \par \par Cranial Nerves: \par \par II: She could finger count bilaterally. Pupils were equal and reactive. Visual fields were full. Funduscopic examination was normal. \par \par III, IV, VI:  Eye movements were full without nystagmus. \par \par V: Facial sensation was intact.  There was mild tenderness over the right mastoid where there was a scar from her parotidectomy.\par \par VII: Facial strength was normal. \par \par VIII: Hearing was equal. \par \par IX, X: Palatal movement was normal. Phonation was normal. \par \par XI: Sternocleidomastoids and trapezii were normal. \par \par XII: Tongue was midline and movements normal. There was no lingual atrophy or fasciculations. \par \par Motor Examination: Muscle bulk, tone and strength were normal. \par \par Sensory Examination: Pinprick, vibration and joint position sense were intact. \par \par Reflexes: DTRs were 2 throughout. \par \par Plantar Responses: Plantar responses were flexor. \par \par Coordination/Cerebellar Function: There was no dysmetria on finger to nose or heel to shin testing. \par \par Gait/Stance: Gait was normal. Romberg was negative.  Tandem was mildly unsteady.\par

## 2022-04-12 NOTE — HISTORY OF PRESENT ILLNESS
[FreeTextEntry1] : Mrs. Marce Chun returned to the office having been last seen on December 27, 2021. She is a 71-year-old right-handed patient with chronic neck and shoulder pain due to cervical spondylosis.  She also has an asymptomatic left frontal meningioma.  \par \par She underwent imaging in November 2021.  MRI of the brain revealed a stable 1.9 x 2 x 2.4 cm partially calcified left frontal meningioma.  Cervical MRI reveals multilevel degenerative change with normal cord signal.\par \par She still has right-sided posterior lateral neck discomfort.  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  \par \par She was suffering from a hypertrophic lesion on her right vertex.  Dr. Lange had seen her for that process.  There was also concern that she might have an occipital nerve injury related to her parotid pleomorphic adenoma resection.\par \par Her major complaint is a hypertrophic lesion in the right vertex.  She is now seeing Dr. Celena Olson, a dermatologist in [place].  She biopsied the lesion.  She was treated with intralesional medication presumably a steroid.  She is now on triamcinolone and fluocinonide cream.\par \par She still has discomfort in her right retroauricular area.  She complains of severe anxiety.

## 2022-04-28 ENCOUNTER — APPOINTMENT (OUTPATIENT)
Dept: NEUROLOGY | Facility: CLINIC | Age: 72
End: 2022-04-28

## 2022-07-07 ENCOUNTER — APPOINTMENT (OUTPATIENT)
Dept: NEUROLOGY | Facility: CLINIC | Age: 72
End: 2022-07-07

## 2022-07-07 VITALS
HEIGHT: 62 IN | SYSTOLIC BLOOD PRESSURE: 151 MMHG | DIASTOLIC BLOOD PRESSURE: 84 MMHG | WEIGHT: 176 LBS | HEART RATE: 79 BPM | BODY MASS INDEX: 32.39 KG/M2

## 2022-07-07 PROCEDURE — 99213 OFFICE O/P EST LOW 20 MIN: CPT

## 2022-07-07 NOTE — PHYSICAL EXAM
[FreeTextEntry1] : Constitutional:  Patient was well-developed, well-nourished and in no acute distress. \par \par Head:  Normocephalic, atraumatic. Tympanic membranes were clear.  Temporal artery pulses were full.\par \par Neck:  Supple with full range of motion.  There was mild cervical tenderness.\par \par Cardiovascular:  Cardiac rhythm was regular without murmur. There were no carotid bruits. Peripheral pulses were full and symmetric. \par \par Respiratory:  Lungs were clear. \par \par Abdomen:  Soft and nontender. \par \par Spine: There was no significant lumbar tenderness or pain on straight leg raising.  Bobby's maneuver was negative.\par \par Skin: There was a hypertrophic area on her right occipital scalp with small scabs at the base of follicles.  There was a small hypertropic patch on her right elbow dorsum.\par \par NEUROLOGICAL EXAMINATION:\par \par Mental Status: She was alert and oriented. Speech was fluent. There was no dysarthria. \par \par Cranial Nerves: \par \par II: She could finger count bilaterally. Pupils were equal and reactive. Visual fields were full. Funduscopic examination was normal. \par \par III, IV, VI:  Eye movements were full without nystagmus. \par \par V: Facial sensation was intact.  There was mild tenderness of the right occiput.\par \par VII: Facial strength was normal. \par \par VIII: Hearing was equal. \par \par IX, X: Palatal movement was normal. Phonation was normal. \par \par XI: Sternocleidomastoids and trapezii were normal. \par \par XII: Tongue was midline and movements normal. There was no lingual atrophy or fasciculations. \par \par Motor Examination: Muscle bulk, tone and strength were normal. \par \par Sensory Examination: Pinprick, vibration and joint position sense were intact. \par \par Reflexes: DTRs were 2 throughout. \par \par Plantar Responses: Plantar responses were flexor. \par \par Coordination/Cerebellar Function: There was no dysmetria on finger to nose or heel to shin testing. \par \par Gait/Stance: Gait was normal. Romberg was negative.  Tandem was mildly unsteady.\par

## 2022-07-07 NOTE — HISTORY OF PRESENT ILLNESS
[FreeTextEntry1] : Mrs. Marce Chun returned to the office having been last seen on April 12, 2022. She is a 72-year-old right-handed patient with chronic neck and shoulder pain due to cervical spondylosis.  She also has an asymptomatic left frontal meningioma.  \par \par She underwent imaging in November 2021.  MRI of the brain revealed a stable 1.9 x 2 x 2.4 cm partially calcified left frontal meningioma.  Cervical MRI reveals multilevel degenerative change with normal cord signal.\par \par When last seen, she was complaining of persistent right-sided posterolateral neck discomfort.  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  \par \par She was suffering from a hypertrophic lesion on her right vertex.  Dr. Lange had seen her for that process.  There was also concern that she might have an occipital nerve injury related to her parotid pleomorphic adenoma resection.\par \par Today's major complaints is due to pain in the right side of her head.  Her dermatologist is now treating her with triamcinolone cream and doxycycline for a lesion on her right vertex.  This has not improved her condition.  She places the palm of her hand on her right occipital and parietal region.  She also in passing reported pain in the right anterior auricular area and she states that it spreads to the right side of her head.  She now reports that her right occipital parietal pain predated excision of a right parotid tumor.  She believes that her pain is all related to paranasal sinus disease because she has a postnasal drip.  She has been evaluated and treated by an otolaryngologist without improvement.  She also complains of right-sided ear ringing.

## 2022-07-07 NOTE — REVIEW OF SYSTEMS
[Abnormal Sensation] : an abnormal sensation [Tension Headache] : tension-type headaches [Negative] : Heme/Lymph [FreeTextEntry9] : Neck and back pain

## 2022-07-07 NOTE — ASSESSMENT
[FreeTextEntry1] : Mrs. Chun is a 72-year-old suffers from chronic pain.  She has cervical and lumbar spondylosis and radiculopathy and an asymptomatic left frontal meningioma. \par \par Today's major complaint was right hemicranial pain which is longstanding.  I am uncertain of the etiology/pain generator.  Occipital nerve injury or chronic migraine are possibilities.\par \par I suggested that she consult with one of our headache specialists, Dr. Pennington or Dr. Ha.  She might be a candidate for Botox or occipital nerve block.  Further management will depend upon that consultation and her clinical course.

## 2022-10-25 ENCOUNTER — APPOINTMENT (OUTPATIENT)
Dept: PAIN MANAGEMENT | Facility: CLINIC | Age: 72
End: 2022-10-25

## 2022-10-25 VITALS
HEIGHT: 62 IN | WEIGHT: 176 LBS | SYSTOLIC BLOOD PRESSURE: 160 MMHG | DIASTOLIC BLOOD PRESSURE: 80 MMHG | BODY MASS INDEX: 32.39 KG/M2 | HEART RATE: 88 BPM

## 2022-10-25 DIAGNOSIS — M47.812 SPONDYLOSIS W/OUT MYELOPATHY OR RADICULOPATHY, CERVICAL REGION: ICD-10-CM

## 2022-10-25 DIAGNOSIS — M54.2 CERVICALGIA: ICD-10-CM

## 2022-10-25 PROCEDURE — 99204 OFFICE O/P NEW MOD 45 MIN: CPT

## 2022-10-25 RX ORDER — DICLOFENAC SODIUM AND MISOPROSTOL 75; 200 MG/1; UG/1
75-0.2 TABLET, DELAYED RELEASE ORAL
Qty: 30 | Refills: 2 | Status: ACTIVE | COMMUNITY
Start: 2022-10-25 | End: 1900-01-01

## 2022-10-26 ENCOUNTER — APPOINTMENT (OUTPATIENT)
Dept: NEUROLOGY | Facility: CLINIC | Age: 72
End: 2022-10-26

## 2022-10-26 VITALS
HEIGHT: 62 IN | DIASTOLIC BLOOD PRESSURE: 83 MMHG | BODY MASS INDEX: 32.39 KG/M2 | WEIGHT: 176 LBS | SYSTOLIC BLOOD PRESSURE: 147 MMHG | HEART RATE: 86 BPM

## 2022-10-26 PROCEDURE — 99213 OFFICE O/P EST LOW 20 MIN: CPT

## 2022-10-26 NOTE — CONSULT LETTER
[Dear  ___] : Dear  [unfilled], [Consult Letter:] : I had the pleasure of evaluating your patient, [unfilled]. [Please see my note below.] : Please see my note below. [Consult Closing:] : Thank you very much for allowing me to participate in the care of this patient.  If you have any questions, please do not hesitate to contact me. [Sincerely,] : Sincerely, [FreeTextEntry3] : Jerry Huerta MD\par  [DrJay Jay ___] : Dr. BROWN [___] : [unfilled]

## 2022-10-26 NOTE — ASSESSMENT
[FreeTextEntry1] : Mrs. Chun is a 72-year-old suffers from chronic pain.  She has cervical and lumbar spondylosis and radiculopathy and an asymptomatic left frontal meningioma. \par \par Her major complaint is right scalp pruritus and pain.  I will request a copy of her recent consultation with Dr. Dwaine Selby, a dermatologist.  She will begin duloxetine for symptomatic relief.  Further management will depend upon her clinical course.\par

## 2022-11-06 PROBLEM — M54.2 CERVICALGIA: Status: ACTIVE | Noted: 2020-10-28

## 2022-11-06 PROBLEM — M47.812 CERVICAL SPONDYLOSIS: Status: ACTIVE | Noted: 2020-10-28

## 2022-11-06 NOTE — HISTORY OF PRESENT ILLNESS
[FreeTextEntry1] : Pt has seen Dr. Huerta for onset of head pain that began about 3 years ago.  \par Notes that she does have some trigger point areas in her neck which are sensitive to touch as well.\par Notes that she has had arthritis "full of arthritis."\par Had sciatic pain last year.\par Has pain into neck and shoulders as well.  Has used alternative treatments with mud baths.  has used some nsaids but finds that upsets her stomach and has led to elevated liver enzymes.\par Has had moderate benefit with etodolac.\par Has used chiropractic and physical therapy for neck and back.\par Has used Mg recently with some benefit on leg cramping.\par Did have bone density last year that showed osteoporosis.\par Does take weekly risedronate.\par Uses icy hot and bengay to back as well.  has tried voltaren gel.\par Did try gabapentin but felt that started to upset her stomach.\par  [Chronic Headache] : chronic headache [Nausea] : no nausea [Photophobia] : no photophobia [Phonophobia] : no phonophobia [Neck Pain] : neck pain [Scalp Tenderness] : scalp tenderness [> 4 hours] : > 4 hours [Stable] : The patient reports ~his/her~ symptoms since the last visit are stable

## 2022-11-06 NOTE — REVIEW OF SYSTEMS
[Fever] : no fever [Chills] : no chills [Feeling Poorly] : not feeling poorly [Feeling Tired] : not feeling tired [Eye Pain] : no eye pain [Eyesight Problems] : no eyesight problems [Earache] : no earache [Nasal Discharge] : no nasal discharge [Chest Pain] : no chest pain [Palpitations] : no palpitations [Shortness Of Breath] : no shortness of breath [Cough] : no cough [Arthralgias] : arthralgias [Neck Pain] : neck pain [Lower Back Pain] : no lower back pain [Skin Lesions] : no skin lesions [Itching] : no itching [Convulsions] : no convulsions [Fainting] : no fainting [Sleep Disturbances] : sleep disturbances [Muscle Weakness] : no muscle weakness

## 2022-11-06 NOTE — ASSESSMENT
[FreeTextEntry1] : trial of diclofenac with misoprostol\par consider use of gliacin, coq-10 or cercumin\par continue mg.\par focused pt on neck.\par trial of duloxetine\par prn tricyclic to consider

## 2022-11-06 NOTE — PHYSICAL EXAM
[General Appearance - Alert] : alert [General Appearance - In No Acute Distress] : in no acute distress [General Appearance - Well Nourished] : well nourished [General Appearance - Well Developed] : well developed [Oriented To Time, Place, And Person] : oriented to person, place, and time [Impaired Insight] : insight and judgment were intact [Affect] : the affect was normal [Memory Recent] : recent memory was not impaired [Memory Remote] : remote memory was not impaired [Person] : oriented to person [Place] : oriented to place [Time] : oriented to time [Short Term Intact] : short term memory intact [Remote Intact] : remote memory intact [Registration Intact] : recent registration memory intact [Concentration Intact] : normal concentrating ability [Visual Intact] : visual attention was ~T not ~L decreased [Naming Objects] : no difficulty naming common objects [Writing A Sentence] : no difficulty writing a sentence [Fluency] : fluency intact [Comprehension] : comprehension intact [Reading] : reading intact [Current Events] : adequate knowledge of current events [Past History] : adequate knowledge of personal past history [Cranial Nerves Oculomotor (III)] : extraocular motion intact [Cranial Nerves Facial (VII)] : face symmetrical [Cranial Nerves Hypoglossal (XII)] : there was no tongue deviation with protrusion [Motor Handedness Right-Handed] : the patient is right hand dominant [Motor Strength Upper Extremities Bilaterally] : strength was normal in both upper extremities [Sensation Tactile Decrease] : light touch was intact [Abnormal Walk] : normal gait [Tremor] : no tremor present [Dysdiadochokinesia Bilaterally] : not present [1+] : Brachioradialis left 1+ [Sclera] : the sclera and conjunctiva were normal [No MARISOL] : no internuclear ophthalmoplegia [Strabismus] : no strabismus was seen [Outer Ear] : the ears and nose were normal in appearance [Hearing Threshold Finger Rub Not [place]] : hearing was normal [Neck Appearance] : the appearance of the neck was normal [Exaggerated Use Of Accessory Muscles For Inspiration] : no accessory muscle use [Nail Clubbing] : no clubbing  or cyanosis of the fingernails [Involuntary Movements] : no involuntary movements were seen [] : no rash

## 2022-11-25 ENCOUNTER — RX RENEWAL (OUTPATIENT)
Age: 72
End: 2022-11-25

## 2023-02-14 ENCOUNTER — RX RENEWAL (OUTPATIENT)
Age: 73
End: 2023-02-14

## 2023-05-07 ENCOUNTER — RX RENEWAL (OUTPATIENT)
Age: 73
End: 2023-05-07

## 2023-06-28 ENCOUNTER — APPOINTMENT (OUTPATIENT)
Dept: NEUROLOGY | Facility: CLINIC | Age: 73
End: 2023-06-28
Payer: MEDICARE

## 2023-06-28 VITALS
DIASTOLIC BLOOD PRESSURE: 81 MMHG | HEART RATE: 108 BPM | BODY MASS INDEX: 32.2 KG/M2 | HEIGHT: 62 IN | SYSTOLIC BLOOD PRESSURE: 129 MMHG | WEIGHT: 175 LBS

## 2023-06-28 PROCEDURE — 99214 OFFICE O/P EST MOD 30 MIN: CPT

## 2023-06-28 NOTE — ASSESSMENT
[FreeTextEntry1] : Mrs. Chun is a 73-year-old suffers from chronic scalp, neck and back pain.  She complains of fatigue.  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  She is on duloxetine 20 mg/day and is resistant to increasing the dose.  She is undergoing dermatologic care for her scalp and physician.  Dr. Goldberg is managing her cervical and lumbar arthritis.  Regarding her left frontal calcified meningioma, I suggested a follow-up MRI of the brain with and without contrast in 6 to 12 months.  Telephone contact will be maintained in the meantime.

## 2023-06-28 NOTE — HISTORY OF PRESENT ILLNESS
[FreeTextEntry1] : Mrs. Marce Chun returned to the office having been last seen on October 26, 2022. She is a 73-year-old right-handed patient with chronic neck and shoulder pain due to cervical spondylosis.  She also has an asymptomatic left frontal meningioma.  \par \par She underwent imaging in November 2021.  MRI of the brain revealed a stable 1.9 x 2 x 2.4 cm partially calcified left frontal meningioma compared to 2018.  Cervical MRI revealed multilevel degenerative change with normal cord signal.\par \par In April 2022, she was complaining of persistent right-sided posterolateral neck discomfort.  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  \par \par She had a longstanding hypertrophic lesion on her right vertex.  Dr. Lange, a dermatologist had seen her for that process.  There was also concern that she might have an occipital nerve injury related to her parotid pleomorphic adenoma resection.  She was subsequently seen by Dr. Dwaine Selby at [facility] dermatology.\par \par She is currently under the care of Dr. Eli Cruz, a dermatologist.  She is receiving steroid injections as well as topical betamethasone and mometasone.  Her scalp symptoms are improved.  She has chronic neck and back pain.  She is under the care of Dr. Avram Goldberg, a rheumatologist.  He suggested increasing duloxetine from 20 to 40 mg/day.  This was not done because it makes her sleepy.\par \par Medications include losartan HCT, etodolac, risperidone, and duloxetine 20 mg/day.  She receives intraocular injections for right eye venous thrombosis.  She is under the care of Dr. Agudelo, an ophthalmologist.

## 2023-06-28 NOTE — PHYSICAL EXAM
[FreeTextEntry1] : Constitutional:  Patient was well-developed, well-nourished and in no acute distress. \par \par Head:  Normocephalic, atraumatic. Tympanic membranes were clear.  Temporal artery pulses were full.\par \par Neck:  Supple with full range of motion.  There was mild cervical tenderness.\par \par Cardiovascular:  Cardiac rhythm was regular without murmur. There were no carotid bruits. Peripheral pulses were full and symmetric. \par \par Respiratory:  Lungs were clear. \par \par Abdomen:  Soft and nontender. \par \par Spine: There was no significant lumbar tenderness or pain on straight leg raising.  Bobby's maneuver was negative.\par \par Skin: There was a hypertrophic area on her right occipital scalp with small scabs at the base of follicles.  \par \par NEUROLOGICAL EXAMINATION:\par \par Mental Status: She was alert and oriented. Speech was fluent. There was no dysarthria. \par \par Cranial Nerves: \par \par II: She could finger count bilaterally. Pupils were equal and reactive. Visual fields were full. Funduscopic examination was normal. \par \par III, IV, VI:  Eye movements were full without nystagmus. \par \par V: Facial sensation was intact.  There was mild tenderness of the right occiput.\par \par VII: Facial strength was normal. \par \par VIII: Hearing was equal. \par \par IX, X: Palatal movement was normal. Phonation was normal. \par \par XI: Sternocleidomastoids and trapezii were normal. \par \par XII: Tongue was midline and movements normal. There was no lingual atrophy or fasciculations. \par \par Motor Examination: Muscle bulk, tone and strength were normal. \par \par Sensory Examination: Pinprick, vibration and joint position sense were intact. \par \par Reflexes: DTRs were 2 throughout. \par \par Plantar Responses: Plantar responses were flexor. \par \par Coordination/Cerebellar Function: There was no dysmetria on finger to nose or heel to shin testing. \par \par Gait/Stance: Gait was normal. Romberg was negative.  Tandem was mildly unsteady.\par

## 2023-07-12 ENCOUNTER — RX RENEWAL (OUTPATIENT)
Age: 73
End: 2023-07-12

## 2023-09-05 ENCOUNTER — RX RENEWAL (OUTPATIENT)
Age: 73
End: 2023-09-05

## 2023-09-26 ENCOUNTER — APPOINTMENT (OUTPATIENT)
Dept: SURGERY | Facility: CLINIC | Age: 73
End: 2023-09-26
Payer: MEDICARE

## 2023-09-26 DIAGNOSIS — K11.8 OTHER DISEASES OF SALIVARY GLANDS: ICD-10-CM

## 2023-09-26 PROCEDURE — 99214 OFFICE O/P EST MOD 30 MIN: CPT

## 2023-11-22 ENCOUNTER — RX RENEWAL (OUTPATIENT)
Age: 73
End: 2023-11-22

## 2024-01-26 ENCOUNTER — RX RENEWAL (OUTPATIENT)
Age: 74
End: 2024-01-26

## 2024-03-22 ENCOUNTER — RX RENEWAL (OUTPATIENT)
Age: 74
End: 2024-03-22

## 2024-04-12 ENCOUNTER — RX CHANGE (OUTPATIENT)
Age: 74
End: 2024-04-12

## 2024-04-12 RX ORDER — DULOXETINE HYDROCHLORIDE 20 MG/1
20 CAPSULE, DELAYED RELEASE PELLETS ORAL
Qty: 30 | Refills: 1 | Status: DISCONTINUED | COMMUNITY
Start: 2022-10-25 | End: 2024-04-12

## 2024-04-29 ENCOUNTER — APPOINTMENT (OUTPATIENT)
Dept: NEUROLOGY | Facility: CLINIC | Age: 74
End: 2024-04-29
Payer: MEDICARE

## 2024-04-29 VITALS
BODY MASS INDEX: 32.2 KG/M2 | WEIGHT: 175 LBS | HEIGHT: 62 IN | DIASTOLIC BLOOD PRESSURE: 86 MMHG | SYSTOLIC BLOOD PRESSURE: 156 MMHG | HEART RATE: 94 BPM

## 2024-04-29 DIAGNOSIS — D32.0 BENIGN NEOPLASM OF CEREBRAL MENINGES: ICD-10-CM

## 2024-04-29 PROCEDURE — 99213 OFFICE O/P EST LOW 20 MIN: CPT

## 2024-04-29 NOTE — PHYSICAL EXAM
[FreeTextEntry1] : Constitutional:  Patient was well-developed, well-nourished and in no acute distress.   Head:  Normocephalic, atraumatic. Tympanic membranes were clear.  There was mild tenderness and erythema of the right vertex scalp.  Neck:  Supple with full range of motion.  There was mild cervical tenderness.  Cardiovascular:  Cardiac rhythm was regular without murmur. There were no carotid bruits. Peripheral pulses were full and symmetric.   Respiratory:  Lungs were clear.   Abdomen:  Soft and nontender.   Spine: There was no significant lumbar tenderness or pain on straight leg raising.  Bobby's maneuver was negative.  Skin: There was an erythematous hypertrophic area on her right occipital scalp with small nodules.  NEUROLOGICAL EXAMINATION:  Mental Status: She was alert and oriented. Speech was fluent. There was no dysarthria.   Cranial Nerves:   II: She could finger count bilaterally. Pupils were equal and reactive. Visual fields were full. Funduscopic examination was normal.   III, IV, VI:  Eye movements were full without nystagmus.   V: Facial sensation was intact.  There was mild tenderness of the right occiput.  VII: Facial strength was normal.   VIII: Hearing was equal.   IX, X: Palatal movement was normal. Phonation was normal.   XI: Sternocleidomastoids and trapezii were normal.   XII: Tongue was midline and movements normal. There was no lingual atrophy or fasciculations.   Motor Examination: Muscle bulk, tone and strength were normal.   Sensory Examination: Pinprick, vibration and joint position sense were intact.   Reflexes: DTRs were 2 throughout.   Plantar Responses: Plantar responses were flexor.   Coordination/Cerebellar Function: There was no dysmetria on finger to nose or heel to shin testing.   Gait/Stance: Gait was normal. Romberg was negative.  Tandem was mildly unsteady.

## 2024-04-29 NOTE — ASSESSMENT
[FreeTextEntry1] : Mrs. Chun is a 73-year-old suffers from chronic scalp, neck and back pain.  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  Her symptoms are consistent with neurogenic pruritus.  She may have a superimposed irritative process due to constant itching.  I suggested pain management evaluation with Dr. Roderick Pennington for possible occipital nerve block.  She will also undergo an MRI of the brain with and without contrast to follow her left frontal meningioma.  Her last imaging was performed 2 and half years ago.  Further management will depend upon these results and her clinical course.

## 2024-04-29 NOTE — CONSULT LETTER
[Dear  ___] : Dear  [unfilled], [Consult Letter:] : I had the pleasure of evaluating your patient, [unfilled]. [Please see my note below.] : Please see my note below. [Consult Closing:] : Thank you very much for allowing me to participate in the care of this patient.  If you have any questions, please do not hesitate to contact me. [Sincerely,] : Sincerely, [FreeTextEntry3] : Jerry uHerta MD\par   [DrJay Jay  ___] : Dr. BROWN [DrJay Jay ___] : Dr. BROWN [___] : [unfilled]

## 2024-04-29 NOTE — HISTORY OF PRESENT ILLNESS
[FreeTextEntry1] : Mrs. Marce Chun returned to the office having been last seen on June 28, 2023. She is a 73-year-old right-handed patient with chronic neck and shoulder pain due to cervical spondylosis.  She also had an asymptomatic left frontal meningioma.    She underwent imaging in November 2021.  MRI of the brain revealed a stable 1.9 x 2 x 2.4 cm partially calcified left frontal meningioma compared to 2018.  Cervical MRI revealed multilevel degenerative change with normal cord signal.  In April 2022, she was complaining of persistent right-sided posterolateral neck discomfort.  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    She had a longstanding hypertrophic lesion on her right vertex.  Dr. Lange, a dermatologist had seen her for that process.  There was also concern that she might have an occipital nerve injury related to her parotid pleomorphic adenoma resection.  She was subsequently seen by Dr. Dwaine Selby at [facility].  At her June 2023 visit, she was under the care of Dr. Eli Cruz, a dermatologist.  She was receiving steroid injections as well as topical betamethasone and mometasone.  Her scalp symptoms had improved.  She had chronic neck and back pain.  She was under the care of Dr. Avram Goldberg, a rheumatologist.  He suggested increasing duloxetine from 20 to 40 mg/day.  This was not done because it made her sleepy.  She complains of persistent pruritus of her right vertex scalp.  The scalp is erythematous.  She complains of discomfort which emanates from around her right ear up into her posterior parietal scalp.  She is constantly itching the scalp.  She is now under the care of Dr. Isak Diaz in [place].  She is being treated with steroid cream and fluconazole.  She denies any major neck or low back pain.  Medications include losartan HCT and duloxetine 20 mg/day.  She receives intraocular injections for right eye venous thrombosis.  She is under the care of Dr. Agudelo, an ophthalmologist.

## 2024-05-28 ENCOUNTER — APPOINTMENT (OUTPATIENT)
Dept: PAIN MANAGEMENT | Facility: CLINIC | Age: 74
End: 2024-05-28
Payer: MEDICARE

## 2024-05-28 DIAGNOSIS — R51.9 HEADACHE, UNSPECIFIED: ICD-10-CM

## 2024-05-28 DIAGNOSIS — M54.81 OCCIPITAL NEURALGIA: ICD-10-CM

## 2024-05-28 PROCEDURE — 99215 OFFICE O/P EST HI 40 MIN: CPT

## 2024-05-31 PROBLEM — M54.81 OCCIPITAL NEURALGIA OF RIGHT SIDE: Status: ACTIVE | Noted: 2021-12-27

## 2024-05-31 PROBLEM — R51.9 CHRONIC DAILY HEADACHE: Status: ACTIVE | Noted: 2022-07-07

## 2024-05-31 NOTE — REVIEW OF SYSTEMS
[Feeling Tired] : feeling tired [Neck Pain] : neck pain [Lower Back Pain] : lower back pain [Itching] : itching [Limb Weakness] : limb weakness [Muscle Weakness] : muscle weakness [Feelings Of Weakness] : feelings of weakness

## 2024-06-09 NOTE — PROCEDURE
[Consent Signed] : consent signed [FreeTextEntry1] : Bilateral Lesser and Greater Occipital Nerve Block  The potential benefits as well as rare but possible risks were reviewed with the patient.  These risks including infection including infection, abscess, bleeding including hematoma, nerve injury, failure to relieve pain or worse pain allergic reactions, adverse reactions to medications, vasovagal reactions, falls, etc. Following that discussion, all questions were again answered to the patient's satisfaction, the patient stated their verbal understanding and written consent was obtained.    After obtaining consent, pre-procedure blood pressure and heart rate were stable and recorded in the nursing record. Standard monitors were applied. The patient was placed prone on the fluoroscopy table. The groin/hip region was prepped with alcohol and draped in sterile fashion.   The occipital artery was palpated, and appropriate landmarks were identified. Using 25-gauge 1.5 inch, 5 cc of 2% Lidocaine  was injected intermittently and spread around the target nerves. Aspiration was negative for heme, air, and CSF.  The needle was removed, skin cleansed, gentle pressure was applied until minor bleeding at the scalp ceased.  The patient tolerated the procedure well and no complications were encountered immediately following the procedure. Following the procedure, the patient's vital signs were stable. The patient was discharged home in good condition with post-procedural instructions.  Time Out: Immediately prior to the procedure, the following was verbally confirmed that there is a consent form and that the correct patient, planned procedure, site and side are consistent with documentation and that necessary equipment available prior to the start of the case.  Complications: none EBL: <5 cc

## 2024-06-09 NOTE — HISTORY OF PRESENT ILLNESS
[Headache] : headache [Chronic Headache] : chronic headache [Neck Pain] : neck pain [Weakness] : weakness [Scalp Tenderness] : scalp tenderness [> 15 days per month] : > 15 days per month [stayed the same] : stayed the same [Stable] : Overall, patient's activity level is stable [FreeTextEntry1] : Patient is a 74 year old female with headaches following a parotid resection in 2019. Headaches start in the neck and go to the top of her scalp. she also reports intense pruritus on her scalp. Symptoms worse in the morning. Feels these symptoms exacerbated about a year ago without inciting incident. Has headaches almost every day and uses etodolac. duloxetine, tylenol, NSAIDs, and acupuncture to manage her headaches. Has also tried Physical therapy and Chiropractic care. Presents today for evaluation of headaches.  [Aura] : no aura

## 2024-06-09 NOTE — ASSESSMENT
[FreeTextEntry1] : Marce Chun is a 74 year old female with chronic neck pain and headaches following a parotid resection in 2019. Patient's headaches are likely secondary to possible occipital nerve injury during this procedure and patient may benefit from an occipital nerve block.    Plan: - Occipital nerve block performed today - Continue current pain regimen as prescribed - Follow up in 4 weeks

## 2024-06-18 ENCOUNTER — APPOINTMENT (OUTPATIENT)
Dept: PAIN MANAGEMENT | Facility: CLINIC | Age: 74
End: 2024-06-18

## 2024-06-18 RX ORDER — DULOXETINE HYDROCHLORIDE 30 MG/1
30 CAPSULE, DELAYED RELEASE PELLETS ORAL DAILY
Qty: 30 | Refills: 1 | Status: ACTIVE | COMMUNITY
Start: 1900-01-01 | End: 1900-01-01

## 2024-06-19 ENCOUNTER — APPOINTMENT (OUTPATIENT)
Dept: PAIN MANAGEMENT | Facility: CLINIC | Age: 74
End: 2024-06-19

## 2024-07-12 ENCOUNTER — RX RENEWAL (OUTPATIENT)
Age: 74
End: 2024-07-12

## 2024-07-12 ENCOUNTER — RX CHANGE (OUTPATIENT)
Age: 74
End: 2024-07-12

## 2024-07-12 RX ORDER — DULOXETINE HYDROCHLORIDE 30 MG/1
30 CAPSULE, DELAYED RELEASE PELLETS ORAL
Qty: 90 | Refills: 1 | Status: ACTIVE | COMMUNITY
Start: 1900-01-01 | End: 1900-01-01

## 2024-10-29 ENCOUNTER — APPOINTMENT (OUTPATIENT)
Dept: PAIN MANAGEMENT | Facility: CLINIC | Age: 74
End: 2024-10-29

## 2024-12-30 ENCOUNTER — APPOINTMENT (OUTPATIENT)
Dept: NEUROLOGY | Facility: CLINIC | Age: 74
End: 2024-12-30

## 2025-01-02 ENCOUNTER — RX RENEWAL (OUTPATIENT)
Age: 75
End: 2025-01-02

## 2025-05-16 ENCOUNTER — RX RENEWAL (OUTPATIENT)
Age: 75
End: 2025-05-16

## 2025-09-08 ENCOUNTER — RX RENEWAL (OUTPATIENT)
Age: 75
End: 2025-09-08